# Patient Record
Sex: FEMALE | Race: WHITE | URBAN - METROPOLITAN AREA
[De-identification: names, ages, dates, MRNs, and addresses within clinical notes are randomized per-mention and may not be internally consistent; named-entity substitution may affect disease eponyms.]

---

## 2018-03-27 ENCOUNTER — RECORDS - HEALTHEAST (OUTPATIENT)
Dept: LAB | Facility: CLINIC | Age: 83
End: 2018-03-27

## 2018-03-27 LAB
ANION GAP SERPL CALCULATED.3IONS-SCNC: 9 MMOL/L (ref 5–18)
BUN SERPL-MCNC: 15 MG/DL (ref 8–28)
CALCIUM SERPL-MCNC: 9.3 MG/DL (ref 8.5–10.5)
CHLORIDE BLD-SCNC: 102 MMOL/L (ref 98–107)
CO2 SERPL-SCNC: 32 MMOL/L (ref 22–31)
CREAT SERPL-MCNC: 0.7 MG/DL (ref 0.6–1.1)
ERYTHROCYTE [DISTWIDTH] IN BLOOD BY AUTOMATED COUNT: 12.7 % (ref 11–14.5)
GFR SERPL CREATININE-BSD FRML MDRD: >60 ML/MIN/1.73M2
GLUCOSE BLD-MCNC: 76 MG/DL (ref 70–125)
HCT VFR BLD AUTO: 39.7 % (ref 35–47)
HGB BLD-MCNC: 12.1 G/DL (ref 12–16)
MCH RBC QN AUTO: 30.6 PG (ref 27–34)
MCHC RBC AUTO-ENTMCNC: 30.5 G/DL (ref 32–36)
MCV RBC AUTO: 101 FL (ref 80–100)
PLATELET # BLD AUTO: 233 THOU/UL (ref 140–440)
PMV BLD AUTO: 10.1 FL (ref 8.5–12.5)
POTASSIUM BLD-SCNC: 3.8 MMOL/L (ref 3.5–5)
RBC # BLD AUTO: 3.95 MILL/UL (ref 3.8–5.4)
SODIUM SERPL-SCNC: 143 MMOL/L (ref 136–145)
TSH SERPL DL<=0.005 MIU/L-ACNC: 2.43 UIU/ML (ref 0.3–5)
WBC: 6.9 THOU/UL (ref 4–11)

## 2018-04-28 NOTE — PROGRESS NOTES
GYNECOLOGIC ONCOLOGY CLINIC NOTE  Rosemarie Carey   : 11/3/1926   CINDY: 5/3/18   Dear Dr. 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 She is here today for a follow up visit. Patient is feeling well since she finished treatment .  She denies any fevers, chills, nausea, vomiting.     Ms. Carey was diagnosed with stage II vulvar cancer in 2011. She was treated with left radical vulvectomy and left inguinal/femoral lymph node dissection in 2011. On surveillance in 2011, a left vulvar cyst was noted and she underwent exam under anesthesia and left vulvectomy in 2011. Pathology showed invasive squamous cell carcinoma, keratinizing type and negative lymph nodes.  10/2011, she had a CT-guided biopsy of an enlarged lymph node in the left groin. This showed metastatic squamous cell carcinoma.     PET CT (10/24/11) had the following findings:  1. Interval disease progression. Stable size of the soft tissues in  the vulvar resection bed and perineum that has increased FDG uptake. This could represent recurrence, radiation, or infection. New hypermetabolic left external iliac and common femoral chain  lymphadenopathy.  2. No abnormal FDG uptake is visualized in the neck, chest, or  abdomen. For anatomical details of the neck please see dedicated neck CT report from the same day.  3. Stable 5 mm pulmonary nodule in the left lower lobe that is below the resolution of PET. Suggest continued follow up.   Patient began concurrent chemotherapy with cisplatin and external beam radiation in 2011. She completed 5 cycles of chemotherapy and 30 days of external beam radiation 3 weeks.    She has been free of disease since that time.  Her last follow up visit here was in .    Today she is feeling well except for mild incontinence of urine and ringing in her ears.  She has constipation and is now using senna, miralax and mycolog  cream for candidal vulvitis  Review of Systems:   Systemic   no weight changes; no fever; no chills; no night sweats; no appetite changes   Skin   no rashes, or lesions   Eye   no irritation; no changes in vision   Jeb-Laryngeal   Hearing loss from chemotherapy; no dysphagia; no hoarseness   Pulmonary   no cough; no shortness of breath   Cardiovascular   no chest pain; no palpitations   Gastrointestinal   no diarrhea; no constipation; no abdominal pain; no changes in bowel habits; no blood in stool   Genitourinary   yes - Urinary incontinence (chronic); sores in the vulvar region from radiation   Breast   no breast discharge; no breast changes; no breast pain   Musculoskeletal   no myalgias; no arthralgias; no back pain   Psychiatric   no depressed mood; no anxiety   Hematologic   no tender lymph nodes; no noticeable swellings or lumps   Endocrine   no hot flashes; no heat/cold intolerance   Neurological   no tremor; no numbness and tingling; no headaches; no difficulty sleeping   Patient Active Problem List   Diagnosis     Osteoporosis     Lichen sclerosus     Vulvar cancer (H)     HL (hearing loss)     Paradoxical urinary incontinence           Past Medical History    Diagnosis  Date       Hypertension         Osteoporosis         Basal cell carcinoma NOS          head       Vulvar cancer  1-2011        Dr Price       Arthritis               Past Surgical History    Procedure  Date       Appendectomy         Cholecystectomy         Gyn surgery  2006        hysterectomy       Vulvectomy radical (no groin dissection)         Retinal surgery          L eye       Excise vulva wide local  8/9/2011        Procedure:EXCISE VULVA WIDE LOCAL; Left Zhao-vulvectomy; Surgeon:MINNA PRICE; Location: OR            Health Maintenance Due    Topic  Date Due       TETANUS IMMUNIZATION ( [facility] ASSIGNED)  11/3/1938       ADVANCE DIRECTIVE PLANNING Q5 YRS (NO INBASKET)  11/3/1944       PNEUMOVAX ([facility] ASSIGNED)   11/3/1991            Current outpatient prescriptions    Medication  Sig       aspirin 81 MG tablet  Take 1 tablet by mouth daily.       olmesartan-hydrochlorothiazide (BENICAR HCT) 40-25 MG per tablet  Take 1 tablet by mouth daily.       metoprolol (TOPROL XL) 100 MG 24 hr tablet  Take 100 mg by mouth daily.       solifenacin (VESICARE) 10 MG tablet  Take 1 tablet by mouth daily.       acetaminophen (TYLENOL) 500 MG tablet  Take 1 tablet by mouth every 6 hours as needed.       Multiple Vitamins-Minerals (CENTRUM SILVER PO)  Take 1 daily       Calcium-Vitamin D (CALCIUM + D PO)  Take 2 daily       GLUCOSAMINE CHONDROITIN COMPLX PO  Take 2 daily       POTASSIUM PO  Take 1 daily       ciprofloxacin (CIPRO) 500 MG tablet  Take 1 tablet by mouth 2 times daily for 10 days.       DISCONTD: hydrocodone-acetaminophen 5-325 MG per tablet  Take 1 tablet by mouth every 4 hours as needed for pain. Limit 8 per day.       DISCONTD: ranitidine (ZANTAC) 150 MG tablet  Take 1 tablet by mouth 2 times daily.            Allergies    Allergen  Reactions       Codeine Sulfate              History    Substance Use Topics       Smoking status:  Never Smoker       Smokeless tobacco:  Never Used         Comment: smoked briefly as a young adult          Alcohol Use:  No          occasional glass of wine           History    Drug Use  No        Family History          Family History   Problem Relation Age of Onset     Stroke Mother 95     Stroke Father 61       PHYSICIAL EXAMINATION -   LMP 07/10/1980   /79  Pulse 79  Temp 97  F (36.1  C) (Oral)  Resp 18  Wt 57 kg (125 lb 10.6 oz)  LMP 07/10/1980  SpO2 98%  BMI 23.42 kg/m2  General Appearance: frail, elderly and alert, no distress   HEENT: no thyromegaly, no palpable nodules or masses   Chest- clear   CV- heart rate regular in rate and rhythm  Lymphatic - No adenopathy supraclavicular, axillary nor inguinal   Musculoskeletal: extremities non tender and without edema   Skin:  Hyperpigmentation of vulva, groin, and thighs c/w post radiation changes   Neurological: normal gait, no gross defects   Psychiatric: appropriate mood and affect   Hematological: normal inguinal lymph nodes   Gastrointestinal: abdomen soft, non-tender, non-distended, no organomegaly or masses   Genitourinary: External genitalia consistent with post radiation skin changes (hyperpigmentation) of the left vulva.  The right vulva has a 2 cm, polypoid lesion.  After informed consent, the area was prepped and injected with 1 % lidocaine.  A biopsy was performed of the lesion.  Hemostasis was maintained with silver nitrate and Monsel's solution.  Vagina is smooth without nodularity or masses. Recto-vaginal exam confirms these findings.  EXTREMITIES - Bilateral 1 + edema of lower extremities.  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   1.) 2 cm recurrent cancer suspected of the right vulva    2.) Patient has resumed aspirin.   3.) Hearing loss -  hearing.  4.) Dementia   I have discussed the findings with Ruel, the patient's son.  I recommend that they consider a local excision of this lesion for palliative intent.  We will call them with the results and they will consider treatment options.  The other option is to do no further treatment.  We will call the family with the results of the biopsy.  Marcelle Price MD FACOG   Professor and Chair   Department of Obstetrics, Gynecology and Women's Health   [facility]   [phone]

## 2018-05-03 ENCOUNTER — ONCOLOGY VISIT (OUTPATIENT)
Dept: ONCOLOGY | Facility: CLINIC | Age: 83
End: 2018-05-03
Attending: OBSTETRICS & GYNECOLOGY
Payer: MEDICARE

## 2018-05-03 VITALS
SYSTOLIC BLOOD PRESSURE: 162 MMHG | OXYGEN SATURATION: 98 % | BODY MASS INDEX: 23.42 KG/M2 | WEIGHT: 125.66 LBS | TEMPERATURE: 97 F | HEART RATE: 79 BPM | RESPIRATION RATE: 18 BRPM | DIASTOLIC BLOOD PRESSURE: 79 MMHG

## 2018-05-03 DIAGNOSIS — C51.9 VULVAR CANCER (H): Primary | ICD-10-CM

## 2018-05-03 PROCEDURE — 56605 BIOPSY OF VULVA/PERINEUM: CPT

## 2018-05-03 PROCEDURE — 99214 OFFICE O/P EST MOD 30 MIN: CPT | Mod: 25 | Performed by: OBSTETRICS & GYNECOLOGY

## 2018-05-03 PROCEDURE — 88305 TISSUE EXAM BY PATHOLOGIST: CPT | Performed by: OBSTETRICS & GYNECOLOGY

## 2018-05-03 PROCEDURE — 56605 BIOPSY OF VULVA/PERINEUM: CPT | Performed by: OBSTETRICS & GYNECOLOGY

## 2018-05-03 PROCEDURE — G0463 HOSPITAL OUTPT CLINIC VISIT: HCPCS | Mod: 25

## 2018-05-03 RX ORDER — FLUTICASONE PROPIONATE 50 MCG
2 SPRAY, SUSPENSION (ML) NASAL
COMMUNITY
Start: 2018-01-31

## 2018-05-03 RX ORDER — POTASSIUM CHLORIDE 750 MG/1
10 TABLET, EXTENDED RELEASE ORAL
COMMUNITY
Start: 2015-05-27

## 2018-05-03 ASSESSMENT — PAIN SCALES - GENERAL: PAINLEVEL: NO PAIN (0)

## 2018-05-03 NOTE — MR AVS SNAPSHOT
After Visit Summary   5/3/2018    Rosemarie Carey    MRN: [id number]           Patient Information     Date Of Birth          11/3/1926        Visit Information        Provider Department      5/3/2018 12:30 PM Marcelle Price MD [facility]        Today's Diagnoses     Vulvar cancer (H)    -  1       Follow-ups after your visit        Follow-up notes from your care team     Return if symptoms worsen or fail to improve, for Physical Exam.      Who to contact     If you have questions or need follow up information about today's clinic visit or your schedule please contact [facility] directly at [phone].  Normal or non-critical lab and imaging results will be communicated to you by MyChart, letter or phone within 4 business days after the clinic has received the results. If you do not hear from us within 7 days, please contact the clinic through [employer] or phone. If you have a critical or abnormal lab result, we will notify you by phone as soon as possible.  Submit refill requests through [employer] or call your pharmacy and they will forward the refill request to us. Please allow 3 business days for your refill to be completed.          Additional Information About Your Visit        MyChart Information     [employer] gives you secure access to your electronic health record. If you see a primary care provider, you can also send messages to your care team and make appointments. If you have questions, please call your primary care clinic.  If you do not have a primary care provider, please call [phone] and they will assist you.        Care EveryWhere ID     This is your Care EveryWhere ID. This could be used by other organizations to access your [place] medical records  [id number]        Your Vitals Were     Pulse Temperature Respirations Last Period Pulse Oximetry BMI (Body Mass Index)    79 97  F (36.1  C) (Oral) 18 07/10/1980 98% 23.42 kg/m2        Blood Pressure from Last 3 Encounters:   05/03/18 162/79   07/10/14 112/64   02/27/14 129/78    Weight from Last 3 Encounters:   05/03/18 57 kg (125 lb 10.6 oz)   07/10/14 65.2 kg (143 lb 11.2 oz)   02/27/14 65.7 kg (144 lb 12.8 oz)              We Performed the Following     Surgical Pathology Exam        Primary Care Provider Office Phone # Fax #    Holley Lopez [phone]       [address] [address]        Equal Access to Services     [facility]: Hadii denia miller hadcamilla Sokatina, waaxda mari, qarosalinata kaalmaparul mata, pavithra myrick . So [facility] [phone].    ATENCIÓN: Si habla español, tiene a polanco disposición servicios gratuitos de asistencia lingüística. [facility] [phone].    We comply with applicable federal civil rights laws and [place] laws. We do not discriminate on the basis of race, color, national origin, age, disability, sex, sexual orientation, or gender identity.            Thank you!     Thank you for choosing [facility]  for your care. Our goal is always to provide you with excellent care. Hearing back from our patients is one way we can continue to improve our services. Please take a few minutes to complete the written survey that you may receive in the mail after your visit with us. Thank you!             Your Updated Medication List - Protect others around you: Learn how to safely use, store and throw away your medicines at www.disposemymeds.org.          This list is accurate as of 5/3/18  2:18 PM.  Always use your most recent med list.                   Brand Name Dispense Instructions for use Diagnosis    aspirin 81 MG tablet      Take 1 tablet by mouth daily.        CALCIUM + D PO      Take 2 daily        * multivitamin Tabs tablet      Take 1 tablet by mouth daily    Vulvar cancer (H)       * CENTRUM SILVER PO      Take 1 daily        darifenacin 7.5 MG 24 hr tablet    ENABLEX    30 tablet     Take 1 tablet by mouth daily.    Paradoxical urinary incontinence       fluticasone 50 MCG/ACT spray    FLONASE     2 sprays        GLUCOSAMINE CHONDROITIN COMPLX PO      Take 2 daily        losartan-hydrochlorothiazide 100-25 MG per tablet    HYZAAR     Take 0.5 tablets by mouth daily    Vulvar cancer (H)       nystatin-triamcinolone cream    MYCOLOG II     Apply topically 2 times daily        oxybutynin 5 MG tablet    DITROPAN    60 tablet    Take 1 tablet (5 mg) by mouth 2 times daily    Other urinary incontinence       polyethylene glycol powder    MIRALAX/GLYCOLAX     Take 1 capful by mouth daily        potassium chloride 10 MEQ tablet    K-TAB,KLOR-CON     Take 10 mEq by mouth        POTASSIUM PO      Take 1 tablet by mouth daily    Vulvar cancer (H)       RISPERDAL PO      Take by mouth daily        sennosides 8.6 MG tablet    SENOKOT     Take 1 tablet by mouth daily        TOPROL  MG 24 hr tablet   Generic drug:  metoprolol succinate      Take 100 mg by mouth daily.        TYLENOL 500 MG tablet   Generic drug:  acetaminophen      Take 1 tablet by mouth every 6 hours as needed.        * Notice:  This list has 2 medication(s) that are the same as other medications prescribed for you. Read the directions carefully, and ask your doctor or other care provider to review them with you.

## 2018-05-03 NOTE — NURSING NOTE
"Oncology Rooming Note    May 3, 2018 1:29 PM   Rosemarie Carey is a 89 year old female who presents for:    Chief Complaint   Patient presents with     Oncology Clinic Visit     Return for Poss Re Vulva  Ca      Initial Vitals: /79  Pulse 79  Temp 97  F (36.1  C) (Oral)  Resp 18  Wt 57 kg (125 lb 10.6 oz)  LMP 07/10/1980  SpO2 98%  BMI 23.42 kg/m2 Estimated body mass index is 23.42 kg/(m^2) as calculated from the following:    Height as of 7/10/14: 1.56 m (5' 1.42\").    Weight as of this encounter: 57 kg (125 lb 10.6 oz). Body surface area is 1.57 meters squared.  No Pain (0) Comment: Data Unavailable   Patient's last menstrual period was 07/10/1980.  Allergies reviewed: Yes  Medications reviewed: Yes    Medications: Medication refills not needed today.  Pharmacy name entered into [employer]:    [facility] - [place], [place] - [address] [facility] - [place], [place] - [address]    Clinical concerns: Pt did not have a medication list with her and her son wasent sure of what med she was taken .  Albert  was notified.    10  minutes for nursing intake (face to face time)     Anya Adame MA              "

## 2018-05-03 NOTE — LETTER
5/3/2018       RE: Rosemarie Carey  [address] [place] [address]     Dear Colleague,    Thank you for referring your patient, Rosemarie Carey, to the [facility]. Please see a copy of my visit note below.    GYNECOLOGIC ONCOLOGY CLINIC NOTE  Rosemarie Carey   : 11/3/1926   CINDY: 5/3/18   Dear Dr. 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 She is here today for a follow up visit. Patient is feeling well since she finished treatment .  She denies any fevers, chills, nausea, vomiting.     Ms. Caery was diagnosed with stage II vulvar cancer in 2011. She was treated with left radical vulvectomy and left inguinal/femoral lymph node dissection in 2011. On surveillance in 2011, a left vulvar cyst was noted and she underwent exam under anesthesia and left vulvectomy in 2011. Pathology showed invasive squamous cell carcinoma, keratinizing type and negative lymph nodes.  10/2011, she had a CT-guided biopsy of an enlarged lymph node in the left groin. This showed metastatic squamous cell carcinoma.     PET CT (10/24/11) had the following findings:  1. Interval disease progression. Stable size of the soft tissues in  the vulvar resection bed and perineum that has increased FDG uptake. This could represent recurrence, radiation, or infection. New hypermetabolic left external iliac and common femoral chain  lymphadenopathy.  2. No abnormal FDG uptake is visualized in the neck, chest, or  abdomen. For anatomical details of the neck please see dedicated neck CT report from the same day.  3. Stable 5 mm pulmonary nodule in the left lower lobe that is below the resolution of PET. Suggest continued follow up.   Patient began concurrent chemotherapy with cisplatin and external beam radiation in 2011. She completed 5 cycles of chemotherapy and 30 days of external beam radiation 3 weeks.    She has been  free of disease since that time.  Her last follow up visit here was in 2014.    Today she is feeling well except for mild incontinence of urine and ringing in her ears.  She has constipation and is now using senna, miralax and mycolog cream for candidal vulvitis  Review of Systems:   Systemic   no weight changes; no fever; no chills; no night sweats; no appetite changes   Skin   no rashes, or lesions   Eye   no irritation; no changes in vision   Jeb-Laryngeal   Hearing loss from chemotherapy; no dysphagia; no hoarseness   Pulmonary   no cough; no shortness of breath   Cardiovascular   no chest pain; no palpitations   Gastrointestinal   no diarrhea; no constipation; no abdominal pain; no changes in bowel habits; no blood in stool   Genitourinary   yes - Urinary incontinence (chronic); sores in the vulvar region from radiation   Breast   no breast discharge; no breast changes; no breast pain   Musculoskeletal   no myalgias; no arthralgias; no back pain   Psychiatric   no depressed mood; no anxiety   Hematologic   no tender lymph nodes; no noticeable swellings or lumps   Endocrine   no hot flashes; no heat/cold intolerance   Neurological   no tremor; no numbness and tingling; no headaches; no difficulty sleeping   Patient Active Problem List   Diagnosis     Osteoporosis     Lichen sclerosus     Vulvar cancer (H)     HL (hearing loss)     Paradoxical urinary incontinence           Past Medical History    Diagnosis  Date       Hypertension         Osteoporosis         Basal cell carcinoma NOS          head       Vulvar cancer  1-2011        Dr Price       Arthritis               Past Surgical History    Procedure  Date       Appendectomy         Cholecystectomy         Gyn surgery  2006        hysterectomy       Vulvectomy radical (no groin dissection)         Retinal surgery          L eye       Excise vulva wide local  8/9/2011        Procedure:EXCISE VULVA WIDE LOCAL; Left Zhao-vulvectomy; Surgeon:MINNA PRICE  YANET; Location: OR            Health Maintenance Due    Topic  Date Due       TETANUS IMMUNIZATION ( [facility] ASSIGNED)  11/3/1938       ADVANCE DIRECTIVE PLANNING Q5 YRS (NO INBASKET)  11/3/1944       PNEUMOVAX ([facility] ASSIGNED)  11/3/1991            Current outpatient prescriptions    Medication  Sig       aspirin 81 MG tablet  Take 1 tablet by mouth daily.       olmesartan-hydrochlorothiazide (BENICAR HCT) 40-25 MG per tablet  Take 1 tablet by mouth daily.       metoprolol (TOPROL XL) 100 MG 24 hr tablet  Take 100 mg by mouth daily.       solifenacin (VESICARE) 10 MG tablet  Take 1 tablet by mouth daily.       acetaminophen (TYLENOL) 500 MG tablet  Take 1 tablet by mouth every 6 hours as needed.       Multiple Vitamins-Minerals (CENTRUM SILVER PO)  Take 1 daily       Calcium-Vitamin D (CALCIUM + D PO)  Take 2 daily       GLUCOSAMINE CHONDROITIN COMPLX PO  Take 2 daily       POTASSIUM PO  Take 1 daily       ciprofloxacin (CIPRO) 500 MG tablet  Take 1 tablet by mouth 2 times daily for 10 days.       DISCONTD: hydrocodone-acetaminophen 5-325 MG per tablet  Take 1 tablet by mouth every 4 hours as needed for pain. Limit 8 per day.       DISCONTD: ranitidine (ZANTAC) 150 MG tablet  Take 1 tablet by mouth 2 times daily.            Allergies    Allergen  Reactions       Codeine Sulfate              History    Substance Use Topics       Smoking status:  Never Smoker       Smokeless tobacco:  Never Used         Comment: smoked briefly as a young adult                 Alcohol Use:  No          occasional glass of wine           History    Drug Use  No        Family History          Family History   Problem Relation Age of Onset     Stroke Mother 95     Stroke Father 61        PHYSICIAL EXAMINATION -   LMP 07/10/1980   /79  Pulse 79  Temp 97  F (36.1  C) (Oral)  Resp 18  Wt 57 kg (125 lb 10.6 oz)  LMP 07/10/1980  SpO2 98%  BMI 23.42 kg/m2  General Appearance: frail, elderly and alert, no distress   HEENT:  no thyromegaly, no palpable nodules or masses   Chest- clear   CV- heart rate regular in rate and rhythm  Lymphatic - No adenopathy supraclavicular, axillary nor inguinal   Musculoskeletal: extremities non tender and without edema   Skin: Hyperpigmentation of vulva, groin, and thighs c/w post radiation changes   Neurological: normal gait, no gross defects   Psychiatric: appropriate mood and affect   Hematological: normal inguinal lymph nodes   Gastrointestinal: abdomen soft, non-tender, non-distended, no organomegaly or masses   Genitourinary: External genitalia consistent with post radiation skin changes (hyperpigmentation) of the left vulva.  The right vulva has a 2 cm, polypoid lesion.  After informed consent, the area was prepped and injected with 1 % lidocaine.  A biopsy was performed of the lesion.  Hemostasis was maintained with silver nitrate and Monsel's solution.  Vagina is smooth without nodularity or masses. Recto-vaginal exam confirms these findings.  EXTREMITIES - Bilateral 1 + edema of lower extremities.  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   1.) 2 cm recurrent cancer suspected of the right vulva    2.) Patient has resumed aspirin.   3.) Hearing loss -  hearing.  4.) Dementia   I have discussed the findings with Ruel, the patient's son.  I recommend that they consider a local excision of this lesion for palliative intent.  We will call them with the results and they will consider treatment options.  The other option is to do no further treatment.  We will call the family with the results of the biopsy.  Marcelle Price MD FACOG   Professor and Chair   Department of Obstetrics, Gynecology and Women's Health   [facility]   [phone]

## 2018-05-07 LAB — COPATH REPORT: NORMAL

## 2018-05-08 NOTE — PROGRESS NOTES
Result reviewed by Dr. Price who feels pathology does not match the clinical picture. Dr. Price feels a wide local excision is needed for symptom management. This was forwarded on to Dr. Vaughn for review.

## 2018-05-10 ENCOUNTER — TELEPHONE (OUTPATIENT)
Dept: ONCOLOGY | Facility: CLINIC | Age: 83
End: 2018-05-10

## 2018-05-10 NOTE — TELEPHONE ENCOUNTER
RN had heard from both patient's sons. They have many questions and concerns that the RN answered them to the best of her ability.    Davy (son) would like RN to speak with the care facility NP. He is very concerned that surgery, even with palliative intent, is not the correct thing. He feels his mom may not be around in six months so why put her though this. RN agreed to speak with care facility NP if deemed appropriate by the NP.     Both sons were appreciative of the RN time.     More than 30 minutes were spent discussing questions and addressing concerns.     Naheed Lockwood RN

## 2018-06-19 ENCOUNTER — ONCOLOGY VISIT (OUTPATIENT)
Dept: ONCOLOGY | Facility: CLINIC | Age: 83
End: 2018-06-19
Attending: OBSTETRICS & GYNECOLOGY
Payer: MEDICARE

## 2018-06-19 VITALS
DIASTOLIC BLOOD PRESSURE: 81 MMHG | BODY MASS INDEX: 24.04 KG/M2 | OXYGEN SATURATION: 98 % | HEART RATE: 73 BPM | RESPIRATION RATE: 16 BRPM | SYSTOLIC BLOOD PRESSURE: 168 MMHG | WEIGHT: 129 LBS | TEMPERATURE: 98 F

## 2018-06-19 DIAGNOSIS — C51.9 VULVAR CANCER (H): Primary | ICD-10-CM

## 2018-06-19 PROCEDURE — G0463 HOSPITAL OUTPT CLINIC VISIT: HCPCS

## 2018-06-19 PROCEDURE — 99215 OFFICE O/P EST HI 40 MIN: CPT | Mod: ZP | Performed by: OBSTETRICS & GYNECOLOGY

## 2018-06-19 PROCEDURE — G0463 HOSPITAL OUTPT CLINIC VISIT: HCPCS | Mod: ZF

## 2018-06-19 ASSESSMENT — PAIN SCALES - GENERAL: PAINLEVEL: NO PAIN (0)

## 2018-06-19 NOTE — MR AVS SNAPSHOT
After Visit Summary   6/19/2018    Rosemarie Carey    MRN: [id number]           Patient Information     Date Of Birth          11/3/1926        Visit Information        Provider Department      6/19/2018 11:00 AM Kathleen Vaughn MD [facility]        Today's Diagnoses     Vulvar cancer (H)    -  1      Care Instructions    Continue current wound care    No surgical intervention at this time    Follow-up with Dr. Vaughn in three months for a repeat vulvar exam and possible biopsy    Kathleen Vaughn MD  Gynecologic Oncology  [facility]            Follow-ups after your visit        Follow-up notes from your care team     Return in about 3 months (around 9/19/2018).      Your next 10 appointments already scheduled     Sep 18, 2018 10:00 AM CDT   (Arrive by 9:45 AM)   Return Visit with Kathleen Vaughn MD   [facility] ([facility])    [address]  [address]  [facility] [address] [phone]              Who to contact     If you have questions or need follow up information about today's clinic visit or your schedule please contact [facility] directly at [phone].  Normal or non-critical lab and imaging results will be communicated to you by MyChart, letter or phone within 4 business days after the clinic has received the results. If you do not hear from us within 7 days, please contact the clinic through MyChart or phone. If you have a critical or abnormal lab result, we will notify you by phone as soon as possible.  Submit refill requests through [employer] or call your pharmacy and they will forward the refill request to us. Please allow 3 business days for your refill to be completed.          Additional Information About Your Visit        MyChart Information     [employer] gives you secure access to your electronic health record. If you see a primary care provider, you can also send  messages to your care team and make appointments. If you have questions, please call your primary care clinic.  If you do not have a primary care provider, please call [phone] and they will assist you.        Care EveryWhere ID     This is your Care EveryWhere ID. This could be used by other organizations to access your [facility] medical records  [id number]        Your Vitals Were     Pulse Temperature Respirations Last Period Pulse Oximetry BMI (Body Mass Index)    73 98  F (36.7  C) (Oral) 16 07/10/1980 98% 24.04 kg/m2       Blood Pressure from Last 3 Encounters:   06/19/18 168/81   05/03/18 162/79   07/10/14 112/64    Weight from Last 3 Encounters:   06/19/18 58.5 kg (129 lb)   05/03/18 57 kg (125 lb 10.6 oz)   07/10/14 65.2 kg (143 lb 11.2 oz)              Today, you had the following     No orders found for display       Primary Care Provider Office Phone # Fax #    Holley Lopez [phone]       [address] [address] [address]        Equal Access to Services     [facility]: Hadii aad ku hadmichaelo Sokatina, waaxda luqadaha, qaybta kaalmada adewaleskayada, pavithra myrick . So [facility] [phone].    ATENCIÓN: Si habla español, tiene a polanco disposición servicios gratuitos de asistencia lingüística. Llame al [phone].    We comply with applicable federal civil rights laws and [place] laws. We do not discriminate on the basis of race, color, national origin, age, disability, sex, sexual orientation, or gender identity.            Thank you!     Thank you for choosing [facility]  for your care. Our goal is always to provide you with excellent care. Hearing back from our patients is one way we can continue to improve our services. Please take a few minutes to complete the written survey that you may receive in the mail after your visit with us. Thank you!             Your Updated Medication List - Protect others around you: Learn how  to safely use, store and throw away your medicines at www.disposemymeds.org.          This list is accurate as of 6/19/18 11:59 PM.  Always use your most recent med list.                   Brand Name Dispense Instructions for use Diagnosis    aspirin 81 MG tablet      Take 1 tablet by mouth daily.        CALCIUM + D PO      Take 2 daily        * multivitamin Tabs tablet      Take 1 tablet by mouth daily    Vulvar cancer (H)       * CENTRUM SILVER PO      Take 1 daily        darifenacin 7.5 MG 24 hr tablet    ENABLEX    30 tablet    Take 1 tablet by mouth daily.    Paradoxical urinary incontinence       fluticasone 50 MCG/ACT spray    FLONASE     2 sprays        GLUCOSAMINE CHONDROITIN COMPLX PO      Take 2 daily        losartan-hydrochlorothiazide 100-25 MG per tablet    HYZAAR     Take 0.5 tablets by mouth daily    Vulvar cancer (H)       nystatin-triamcinolone cream    MYCOLOG II     Apply topically 2 times daily        oxybutynin 5 MG tablet    DITROPAN    60 tablet    Take 1 tablet (5 mg) by mouth 2 times daily    Other urinary incontinence       polyethylene glycol powder    MIRALAX/GLYCOLAX     Take 1 capful by mouth daily        potassium chloride 10 MEQ tablet    K-TAB,KLOR-CON     Take 10 mEq by mouth        POTASSIUM PO      Take 1 tablet by mouth daily    Vulvar cancer (H)       RISPERDAL PO      Take by mouth daily        sennosides 8.6 MG tablet    SENOKOT     Take 1 tablet by mouth daily        TOPROL  MG 24 hr tablet   Generic drug:  metoprolol succinate      Take 100 mg by mouth daily.        TYLENOL 500 MG tablet   Generic drug:  acetaminophen      Take 1 tablet by mouth every 6 hours as needed.        * Notice:  This list has 2 medication(s) that are the same as other medications prescribed for you. Read the directions carefully, and ask your doctor or other care provider to review them with you.

## 2018-06-19 NOTE — PROGRESS NOTES
Consult Notes on Referred Patient    Date: 2018       Dr. Holley Lopez MD  [address] [facility] [address]  [place] [address]       RE: Rosemarie Carey  : 11/3/1926  CINDY: 2018    Dear Dr. Holley Lopez:    I had the pleasure of seeing your patient Rosemarie Carey here at the Gynecologic Cancer Clinic at the [facility] on 2018.  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  Given these findings she was subsequently sent to the Gynecologic Cancer Clinic for new patient consultation.     She presents today with family for evaluation:    Per chart review, her history is as follows:    2011: initial diagnosis, underwent left radical vulvectomy with inguinofemoral LN dissection  Aug-Oct 2011: Lt vulvar cyst showing invasive SCC removed, enlarging Lt groin LN +for metastatic disease removed  Dec 2011: cisplatin chemotherapy x5 cycles, 30 days of EBRT    Disease free since , though last f/u 2014: now with 2 cm lesion of Rt vulva. Suspicion for recurrence, Bx obtained.  FINAL DIAGNOSIS:   RIGHT VULVA, BIOPSY:   - Surface ulceration with hyalinization and tissue necrosis   - Benign vascular proliferation, consistent with a reactive/reparative   process   - No evidence of atypia or malignancy      Patient presents today for evaluation and a second opinion.  She was seen by Dr. Price in May at which time she did the biopsy and was concerned about recurrence. She had advised patient and family about need for resection.  The patient and her son are here today for evaluation.  She is not interested in surgical resection, just wants to be comfortable.  Since she saw Dr. Price, she has been receiving diligent vulvar cares and feels that the area of concern is improved.    Past Medical History:    Past Medical History:   Diagnosis Date     Arthritis      Basal cell  carcinoma nos     head     Hypertension      Osteoporosis      Vulvar cancer (H) 1-2011    Stage II B         Past Surgical History:    Past Surgical History:   Procedure Laterality Date     APPENDECTOMY       CHOLECYSTECTOMY       EXCISE VULVA WIDE LOCAL  8/9/2011    Procedure:EXCISE VULVA WIDE LOCAL; Left Zhao-vulvectomy; Surgeon:MINNA AVERY; Location:[facility]     GYN SURGERY  2006    hysterectomy     retinal surgery      L eye     VULVECTOMY RADICAL DISSECT GROIN(S)  2011    nodes negative         Health Maintenance:  Health Maintenance Due   Topic Date Due     TETANUS IMMUNIZATION (SYSTEM ASSIGNED)  11/03/1944     ADVANCE DIRECTIVE PLANNING Q5 YRS  11/03/1981     FALL RISK ASSESSMENT  11/03/1991     PNEUMOCOCCAL (1 of 2 - PCV13) 11/03/1991       Current Medications:     has a current medication list which includes the following prescription(s): acetaminophen, aspirin, calcium citrate-vitamin d, darifenacin, fluticasone, glucosamine-chondroitin, losartan-hydrochlorothiazide, metoprolol succinate, multiple vitamins-minerals, multivitamin, nystatin-triamcinolone, oxybutynin, polyethylene glycol, potassium chloride, potassium, risperidone, and sennosides.       Allergies:      Allergies   Allergen Reactions     Codeine Sulfate             Social History:     Social History   Substance Use Topics     Smoking status: Never Smoker     Smokeless tobacco: Never Used      Comment: smoked briefly as a young adult     Alcohol use No      Comment: occasional glass of wine       History   Drug Use No           Family History:     The patient's family history is notable for :    Family History   Problem Relation Age of Onset     Cerebrovascular Disease Mother 95     Cerebrovascular Disease Father 61         Physical Exam:     LMP 07/10/1980  There is no height or weight on file to calculate BMI.    General Appearance: healthy and alert, no distress     Musculoskeletal: extremities non tender and without  edema    Skin: no lesions or rashes     Neurological: normal gait, no gross defects     Psychiatric: appropriate mood and affect                               Hematological: normal cervical, supraclavicular and inguinal lymph nodes     Gastrointestinal:       abdomen soft, non-tender, non-distended, no organomegaly or masses    Genitourinary: External genitalia with significant scarring and retraction post resection and XRT.  She has radiation associated irritation and breakdown most notably in left groin crease as well as ulceration and nodularity in this area.  I believe this is the region that was biopsied by Dr. Price although the specimen as labelled right.  On the right groin, no gross lesions but notable radiation changes.       Assessment:    Rosemarie Carey is a 89 year old woman with a history of recurrent vulvar cancer, now with concerns for recurrence    A total of 60 minutes was spent with the patient, 50 minutes of which were spent in counseling the patient and/or treatment planning.      Plan:     1.)     History of recurrent vulvar cancer, now with concerns for recurrence:  Detailed discussion with patient and her family.  Biopsy was not definitive for recurrence and my exam findings are slightly different from what was noted by Dr. Price. Additionally, both patient and her family feel that her groin/vulvar area is improved since her visit with Dr. Price due to more diligent skin care.  As this is my first time examining patient, I am unable to determine if this is true.  Regardless, there is an area ulceration and retraction in left groin/vulvar crease which is potentially recurrence versus radiation induced skin breakdown.  Repeat more aggressive biopsy would be need to help differentiate this.  At this time, the patient declines any further intervention. She declines surgery. She is not interested in rebiopsy.  Additionally, her son and family who are present for this discussion are not  supportive of her undergoing additional surgery.  Therefore, offered patient close observation.  It would be helpful for me to see her for a short-term follow-up to re-examine the groin.  This will give me a better sense of her clinical exam findings, if the area of ulceration is enlarging and thus more concerning for malignancy.    Discussed in detail, questions answered, patient and family happy with plan of care.    Kathleen Vaughn MD  Gynecologic Oncology  [facility]      CC  Patient Care Team:  Marifer Campbell MD as PCP - General  Marifer Campbell MD Newton, David A as Consulting Physician (Family Practice)  MARIFER CAMPBELL

## 2018-06-19 NOTE — LETTER
2018       RE: Rosemarie Carey  [address]  [place] [address]     Dear Colleague,    Thank you for referring your patient, Rosemarie Carey, to the [facility]. Please see a copy of my visit note below.                            Consult Notes on Referred Patient    Date: 2018       Dr. Holley Lopez MD  [address]  [place] [address]       RE: Rosemarie Carey  : 11/3/1926  CINDY: 2018    Dear Dr. Holley Lopez:    I had the pleasure of seeing your patient Rosemarie Carey here at the Gynecologic Cancer Clinic at the [facility] on 2018.  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  Given these findings she was subsequently sent to the Gynecologic Cancer Clinic for new patient consultation.     She presents today with family for evaluation:    Per chart review, her history is as follows:    2011: initial diagnosis, underwent left radical vulvectomy with inguinofemoral LN dissection  Aug-Oct 2011: Lt vulvar cyst showing invasive SCC removed, enlarging Lt groin LN +for metastatic disease removed  Dec 2011: cisplatin chemotherapy x5 cycles, 30 days of EBRT    Disease free since , though last f/u 2014: now with 2 cm lesion of Rt vulva. Suspicion for recurrence, Bx obtained.  FINAL DIAGNOSIS:   RIGHT VULVA, BIOPSY:   - Surface ulceration with hyalinization and tissue necrosis   - Benign vascular proliferation, consistent with a reactive/reparative   process   - No evidence of atypia or malignancy      Patient presents today for evaluation and a second opinion.  She was seen by Dr. Price in May at which time she did the biopsy and was concerned about recurrence. She had advised patient and family about need for resection.  The patient and her son are here today for evaluation.  She is not interested in surgical resection, just  wants to be comfortable.  Since she saw Dr. Price, she has been receiving diligent vulvar cares and feels that the area of concern is improved.    Past Medical History:    Past Medical History:   Diagnosis Date     Arthritis      Basal cell carcinoma nos     head     Hypertension      Osteoporosis      Vulvar cancer (H) 1-2011    Stage II B         Past Surgical History:    Past Surgical History:   Procedure Laterality Date     APPENDECTOMY       CHOLECYSTECTOMY       EXCISE VULVA WIDE LOCAL  8/9/2011    Procedure:EXCISE VULVA WIDE LOCAL; Left Zhao-vulvectomy; Surgeon:MINNA PRICE; Location:[facility]     GYN SURGERY  2006    hysterectomy     retinal surgery      L eye     VULVECTOMY RADICAL DISSECT GROIN(S)  2011    nodes negative         Health Maintenance:  Health Maintenance Due   Topic Date Due     TETANUS IMMUNIZATION (SYSTEM ASSIGNED)  11/03/1944     ADVANCE DIRECTIVE PLANNING Q5 YRS  11/03/1981     FALL RISK ASSESSMENT  11/03/1991     PNEUMOCOCCAL (1 of 2 - PCV13) 11/03/1991       Current Medications:     has a current medication list which includes the following prescription(s): acetaminophen, aspirin, calcium citrate-vitamin d, darifenacin, fluticasone, glucosamine-chondroitin, losartan-hydrochlorothiazide, metoprolol succinate, multiple vitamins-minerals, multivitamin, nystatin-triamcinolone, oxybutynin, polyethylene glycol, potassium chloride, potassium, risperidone, and sennosides.       Allergies:      Allergies   Allergen Reactions     Codeine Sulfate             Social History:     Social History   Substance Use Topics     Smoking status: Never Smoker     Smokeless tobacco: Never Used      Comment: smoked briefly as a young adult     Alcohol use No      Comment: occasional glass of wine       History   Drug Use No           Family History:     The patient's family history is notable for :    Family History   Problem Relation Age of Onset     Cerebrovascular Disease Mother 95     Cerebrovascular  Disease Father 61         Physical Exam:     LMP 07/10/1980  There is no height or weight on file to calculate BMI.    General Appearance: healthy and alert, no distress     Musculoskeletal: extremities non tender and without edema    Skin: no lesions or rashes     Neurological: normal gait, no gross defects     Psychiatric: appropriate mood and affect                               Hematological: normal cervical, supraclavicular and inguinal lymph nodes     Gastrointestinal:       abdomen soft, non-tender, non-distended, no organomegaly or masses    Genitourinary: External genitalia with significant scarring and retraction post resection and XRT.  She has radiation associated irritation and breakdown most notably in left groin crease as well as ulceration and nodularity in this area.  I believe this is the region that was biopsied by Dr. Price although the specimen as labelled right.  On the right groin, no gross lesions but notable radiation changes.       Assessment:    Rosemarie Carey is a 89 year old woman with a history of recurrent vulvar cancer, now with concerns for recurrence    A total of 60 minutes was spent with the patient, 50 minutes of which were spent in counseling the patient and/or treatment planning.      Plan:     1.)     History of recurrent vulvar cancer, now with concerns for recurrence:  Detailed discussion with patient and her family.  Biopsy was not definitive for recurrence and my exam findings are slightly different from what was noted by Dr. Price. Additionally, both patient and her family feel that her groin/vulvar area is improved since her visit with Dr. Price due to more diligent skin care.  As this is my first time examining patient, I am unable to determine if this is true.  Regardless, there is an area ulceration and retraction in left groin/vulvar crease which is potentially recurrence versus radiation induced skin breakdown.  Repeat more aggressive biopsy would be need to help  differentiate this.  At this time, the patient declines any further intervention. She declines surgery. She is not interested in rebiopsy.  Additionally, her son and family who are present for this discussion are not supportive of her undergoing additional surgery.  Therefore, offered patient close observation.  It would be helpful for me to see her for a short-term follow-up to re-examine the groin.  This will give me a better sense of her clinical exam findings, if the area of ulceration is enlarging and thus more concerning for malignancy.    Discussed in detail, questions answered, patient and family happy with plan of care.    Kathleen Vaughn MD  Gynecologic Oncology  [facility]      CC  Patient Care Team:  Holley Lopez MD as PCP - General  Holley Lopez MD Newton, David A as Consulting Physician (Family Practice)

## 2018-06-19 NOTE — PATIENT INSTRUCTIONS
Continue current wound care    No surgical intervention at this time    Follow-up with Dr. Vaughn in three months for a repeat vulvar exam and possible biopsy    Kathleen Vaughn MD  Gynecologic Oncology  [facility]

## 2018-06-19 NOTE — NURSING NOTE
"Oncology Rooming Note    June 19, 2018 11:25 AM   Rosemarie Carey is a 89 year old female who presents for:    Chief Complaint   Patient presents with     Oncology Clinic Visit     Vulvar CA; Pt of Dr Price,      Initial Vitals: /81  Pulse 73  Temp 98  F (36.7  C) (Oral)  Resp 16  Wt 58.5 kg (129 lb)  LMP 07/10/1980  SpO2 98%  BMI 24.04 kg/m2 Estimated body mass index is 24.04 kg/(m^2) as calculated from the following:    Height as of 7/10/14: 1.56 m (5' 1.42\").    Weight as of this encounter: 58.5 kg (129 lb). Body surface area is 1.59 meters squared.  No Pain (0) Comment: Data Unavailable   Patient's last menstrual period was 07/10/1980.  Allergies reviewed: Yes  Medications reviewed: Yes    Medications: Medication refills not needed today.  Pharmacy name entered into [employer]:    [facility] - [place], [place] - [address] [facility] - [place], [place] - [address]    Clinical concerns: Patient states there are no new concerns to discuss with provider.  Dr Vaughn was not notified.       8 minutes for nursing intake (face to face time)     Marclele George CMA              "

## 2018-11-14 ENCOUNTER — OFFICE VISIT - HEALTHEAST (OUTPATIENT)
Dept: OTOLARYNGOLOGY | Facility: CLINIC | Age: 83
End: 2018-11-14

## 2018-11-14 DIAGNOSIS — H61.23 BILATERAL IMPACTED CERUMEN: ICD-10-CM

## 2019-04-09 ENCOUNTER — APPOINTMENT (RX ONLY)
Dept: URBAN - NONMETROPOLITAN AREA CLINIC 13 | Facility: CLINIC | Age: 84
Setting detail: DERMATOLOGY
End: 2019-04-09

## 2019-04-09 DIAGNOSIS — L57.0 ACTINIC KERATOSIS: ICD-10-CM

## 2019-04-09 DIAGNOSIS — L57.8 OTHER SKIN CHANGES DUE TO CHRONIC EXPOSURE TO NONIONIZING RADIATION: ICD-10-CM

## 2019-04-09 PROBLEM — Z85.828 PERSONAL HISTORY OF OTHER MALIGNANT NEOPLASM OF SKIN: Status: ACTIVE | Noted: 2019-04-09

## 2019-04-09 PROBLEM — M12.9 ARTHROPATHY, UNSPECIFIED: Status: ACTIVE | Noted: 2019-04-09

## 2019-04-09 PROBLEM — E13.9 OTHER SPECIFIED DIABETES MELLITUS WITHOUT COMPLICATIONS: Status: ACTIVE | Noted: 2019-04-09

## 2019-04-09 PROBLEM — H91.90 UNSPECIFIED HEARING LOSS, UNSPECIFIED EAR: Status: ACTIVE | Noted: 2019-04-09

## 2019-04-09 PROBLEM — E03.9 HYPOTHYROIDISM, UNSPECIFIED: Status: ACTIVE | Noted: 2019-04-09

## 2019-04-09 PROBLEM — I10 ESSENTIAL (PRIMARY) HYPERTENSION: Status: ACTIVE | Noted: 2019-04-09

## 2019-04-09 PROCEDURE — 17000 DESTRUCT PREMALG LESION: CPT

## 2019-04-09 PROCEDURE — ? LIQUID NITROGEN

## 2019-04-09 PROCEDURE — 17003 DESTRUCT PREMALG LES 2-14: CPT

## 2019-04-09 PROCEDURE — ? SUNSCREEN RECOMMENDATIONS

## 2019-04-09 PROCEDURE — 99201: CPT | Mod: 25

## 2019-04-09 ASSESSMENT — LOCATION DETAILED DESCRIPTION DERM
LOCATION DETAILED: RIGHT MEDIAL SUPERIOR CHEST
LOCATION DETAILED: LEFT UPPER CUTANEOUS LIP
LOCATION DETAILED: STERNAL NOTCH
LOCATION DETAILED: UPPER STERNUM
LOCATION DETAILED: LEFT CENTRAL MALAR CHEEK
LOCATION DETAILED: LEFT MEDIAL SUPERIOR CHEST

## 2019-04-09 ASSESSMENT — LOCATION SIMPLE DESCRIPTION DERM
LOCATION SIMPLE: CHEST
LOCATION SIMPLE: LEFT CHEEK
LOCATION SIMPLE: LEFT LIP

## 2019-04-09 ASSESSMENT — LOCATION ZONE DERM
LOCATION ZONE: TRUNK
LOCATION ZONE: FACE
LOCATION ZONE: LIP

## 2019-04-09 NOTE — HPI: SKIN LESION
Is This A New Presentation, Or A Follow-Up?: Skin Lesion
How Severe Is Your Skin Lesion?: mild
Has Your Skin Lesion Been Treated?: been treated
Additional History: Lesion of concern

## 2019-04-09 NOTE — PROCEDURE: LIQUID NITROGEN
Render Post-Care Instructions In Note?: no
Consent: The patient's consent was obtained including but not limited to risks of crusting, scabbing, blistering, scarring, darker or lighter pigmentary change, recurrence, incomplete removal and infection.
Number Of Freeze-Thaw Cycles: 1 freeze-thaw cycle
Detail Level: Detailed
Post-Care Instructions: I reviewed with the patient in detail post-care instructions. Patient is to wear sunprotection, and avoid picking at any of the treated lesions. Pt may apply Vaseline to crusted or scabbing areas.  They were told if any of the lesions fail to resolve to f/u and have them evaluated.
Duration Of Freeze Thaw-Cycle (Seconds): 1
Aperture Size (Optional): C

## 2019-05-12 ENCOUNTER — HOME CARE/HOSPICE - HEALTHEAST (OUTPATIENT)
Dept: HOSPICE | Facility: HOSPICE | Age: 84
End: 2019-05-12

## 2019-05-13 ENCOUNTER — HOME CARE/HOSPICE - HEALTHEAST (OUTPATIENT)
Dept: HOSPICE | Facility: HOSPICE | Age: 84
End: 2019-05-13

## 2019-05-14 ENCOUNTER — HOME CARE/HOSPICE - HEALTHEAST (OUTPATIENT)
Dept: HOSPICE | Facility: HOSPICE | Age: 84
End: 2019-05-14

## 2019-05-19 ENCOUNTER — HOME CARE/HOSPICE - HEALTHEAST (OUTPATIENT)
Dept: HOSPICE | Facility: HOSPICE | Age: 84
End: 2019-05-19

## 2019-10-24 ENCOUNTER — APPOINTMENT (RX ONLY)
Dept: URBAN - NONMETROPOLITAN AREA CLINIC 13 | Facility: CLINIC | Age: 84
Setting detail: DERMATOLOGY
End: 2019-10-24

## 2019-10-24 DIAGNOSIS — L57.8 OTHER SKIN CHANGES DUE TO CHRONIC EXPOSURE TO NONIONIZING RADIATION: ICD-10-CM

## 2019-10-24 DIAGNOSIS — L57.0 ACTINIC KERATOSIS: ICD-10-CM

## 2019-10-24 PROCEDURE — 17000 DESTRUCT PREMALG LESION: CPT

## 2019-10-24 PROCEDURE — 17003 DESTRUCT PREMALG LES 2-14: CPT

## 2019-10-24 PROCEDURE — ? SUNSCREEN RECOMMENDATIONS

## 2019-10-24 PROCEDURE — ? LIQUID NITROGEN

## 2019-10-24 PROCEDURE — 99212 OFFICE O/P EST SF 10 MIN: CPT | Mod: 25

## 2019-10-24 ASSESSMENT — LOCATION SIMPLE DESCRIPTION DERM
LOCATION SIMPLE: LEFT CHEEK
LOCATION SIMPLE: RIGHT FOREARM
LOCATION SIMPLE: LEFT HAND
LOCATION SIMPLE: LEFT FOREHEAD
LOCATION SIMPLE: RIGHT FOREHEAD
LOCATION SIMPLE: LEFT CLAVICULAR SKIN
LOCATION SIMPLE: RIGHT ANTERIOR NECK
LOCATION SIMPLE: LEFT ANTERIOR NECK
LOCATION SIMPLE: CHEST

## 2019-10-24 ASSESSMENT — LOCATION DETAILED DESCRIPTION DERM
LOCATION DETAILED: LEFT CLAVICULAR NECK
LOCATION DETAILED: LEFT CLAVICULAR SKIN
LOCATION DETAILED: RIGHT INFERIOR LATERAL NECK
LOCATION DETAILED: UPPER STERNUM
LOCATION DETAILED: RIGHT LATERAL FOREHEAD
LOCATION DETAILED: LEFT ULNAR DORSAL HAND
LOCATION DETAILED: LEFT CENTRAL MALAR CHEEK
LOCATION DETAILED: STERNAL NOTCH
LOCATION DETAILED: RIGHT PROXIMAL DORSAL FOREARM
LOCATION DETAILED: RIGHT SUPERIOR LATERAL FOREHEAD
LOCATION DETAILED: RIGHT DISTAL DORSAL FOREARM
LOCATION DETAILED: LEFT MEDIAL SUPERIOR CHEST
LOCATION DETAILED: LEFT MEDIAL FOREHEAD

## 2019-10-24 ASSESSMENT — LOCATION ZONE DERM
LOCATION ZONE: NECK
LOCATION ZONE: ARM
LOCATION ZONE: TRUNK
LOCATION ZONE: HAND
LOCATION ZONE: FACE

## 2019-10-24 NOTE — PROCEDURE: LIQUID NITROGEN
Render Note In Bullet Format When Appropriate: No
Aperture Size (Optional): C
Detail Level: Detailed
Consent: The patient's consent was obtained including but not limited to risks of crusting, scabbing, blistering, scarring, darker or lighter pigmentary change, recurrence, incomplete removal and infection.
Number Of Freeze-Thaw Cycles: 1 freeze-thaw cycle
Post-Care Instructions: I reviewed with the patient in detail post-care instructions. Patient is to wear sunprotection, and avoid picking at any of the treated lesions. Pt may apply Vaseline to crusted or scabbing areas.  They were told if any of the lesions fail to resolve to f/u and have them evaluated.
Duration Of Freeze Thaw-Cycle (Seconds): 1

## 2020-03-01 ENCOUNTER — HEALTH MAINTENANCE LETTER (OUTPATIENT)
Age: 85
End: 2020-03-01

## 2020-12-14 ENCOUNTER — HEALTH MAINTENANCE LETTER (OUTPATIENT)
Age: 85
End: 2020-12-14

## 2021-03-18 ENCOUNTER — APPOINTMENT (RX ONLY)
Dept: URBAN - NONMETROPOLITAN AREA CLINIC 13 | Facility: CLINIC | Age: 86
Setting detail: DERMATOLOGY
End: 2021-03-18

## 2021-03-18 DIAGNOSIS — L57.8 OTHER SKIN CHANGES DUE TO CHRONIC EXPOSURE TO NONIONIZING RADIATION: ICD-10-CM

## 2021-03-18 DIAGNOSIS — L57.0 ACTINIC KERATOSIS: ICD-10-CM

## 2021-03-18 PROCEDURE — 17000 DESTRUCT PREMALG LESION: CPT

## 2021-03-18 PROCEDURE — ? LIQUID NITROGEN

## 2021-03-18 PROCEDURE — ? COUNSELING

## 2021-03-18 PROCEDURE — 17003 DESTRUCT PREMALG LES 2-14: CPT

## 2021-03-18 PROCEDURE — 99212 OFFICE O/P EST SF 10 MIN: CPT | Mod: 25

## 2021-03-18 PROCEDURE — ? SUNSCREEN RECOMMENDATIONS

## 2021-03-18 ASSESSMENT — LOCATION DETAILED DESCRIPTION DERM
LOCATION DETAILED: LEFT UPPER CUTANEOUS LIP
LOCATION DETAILED: STERNAL NOTCH
LOCATION DETAILED: RIGHT MEDIAL SUPERIOR CHEST
LOCATION DETAILED: LEFT INFERIOR CENTRAL MALAR CHEEK
LOCATION DETAILED: LEFT MEDIAL SUPERIOR CHEST
LOCATION DETAILED: LEFT CENTRAL MALAR CHEEK
LOCATION DETAILED: LEFT SUPERIOR CENTRAL MALAR CHEEK
LOCATION DETAILED: MIDDLE STERNUM
LOCATION DETAILED: INFERIOR MID FOREHEAD
LOCATION DETAILED: UPPER STERNUM
LOCATION DETAILED: RIGHT CLAVICULAR SKIN

## 2021-03-18 ASSESSMENT — LOCATION SIMPLE DESCRIPTION DERM
LOCATION SIMPLE: RIGHT CLAVICULAR SKIN
LOCATION SIMPLE: LEFT CHEEK
LOCATION SIMPLE: LEFT LIP
LOCATION SIMPLE: INFERIOR FOREHEAD
LOCATION SIMPLE: CHEST

## 2021-03-18 ASSESSMENT — LOCATION ZONE DERM
LOCATION ZONE: TRUNK
LOCATION ZONE: FACE
LOCATION ZONE: LIP

## 2021-03-18 NOTE — PROCEDURE: LIQUID NITROGEN
Consent: The patient's consent was obtained including but not limited to risks of crusting, scabbing, blistering, scarring, darker or lighter pigmentary change, recurrence, incomplete removal and infection.
Aperture Size (Optional): C
Post-Care Instructions: I reviewed with the patient in detail post-care instructions. Patient is to wear sunprotection, and avoid picking at any of the treated lesions. Pt may apply Vaseline to crusted or scabbing areas.  They were told if any of the lesions fail to resolve to f/u and have them evaluated.
Number Of Freeze-Thaw Cycles: 1 freeze-thaw cycle
Duration Of Freeze Thaw-Cycle (Seconds): 1
Render Note In Bullet Format When Appropriate: No
Detail Level: Detailed

## 2021-04-17 ENCOUNTER — HEALTH MAINTENANCE LETTER (OUTPATIENT)
Age: 86
End: 2021-04-17

## 2021-06-21 NOTE — PROGRESS NOTES
HPI: This patient presents to clinic today for cerumen removal.  She is accompanied by her son.  Patient does wear hearing aids which she got from [facility] at baseline, but there is a concern they aren't working properly.  Lives in memory care. Has noticed some fullness of the ears and muffled hearing, but denies otalgia, otorrhea, vertigo, change in true hearing.  Has tinnitus at baseline. Denies other symptoms.    Past medical history, surgical history, family history, social history, medications, and allergies have been reviewed and are documented above.     Review of Systems: a 10-system review was performed. Symptoms are noted in the HPI and on a scanned document in the computer chart.    Physical Examination:   GEN: no acute distress, alert and oriented  EYES: extraocular movements intact, pupils equal and round. Sclera clear.   EARS: bilateral cerumen impactions cleared under binocular microscopy using curette, forceps, and suction. The tympanic membranes are noted to be intact and clear with no signs of infections, effusions, perforations, or retractions.  PULM: breathing comfortably on room air with no stertor or stridor. Chest expansion symmetric.  NEURO: dementia, shuffling gate    MEDICAL DECISION-MAKING: Cerumen impactions cleared under binocular microscopy without difficulty. Hearing restored to baseline. Encouraged patient and son to follow-up with [facility] regarding hearing aids. Follow-up PRN.

## 2021-10-02 ENCOUNTER — HEALTH MAINTENANCE LETTER (OUTPATIENT)
Age: 86
End: 2021-10-02

## 2022-05-14 ENCOUNTER — HEALTH MAINTENANCE LETTER (OUTPATIENT)
Age: 87
End: 2022-05-14

## 2022-09-03 ENCOUNTER — HEALTH MAINTENANCE LETTER (OUTPATIENT)
Age: 87
End: 2022-09-03

## 2023-06-02 ENCOUNTER — HEALTH MAINTENANCE LETTER (OUTPATIENT)
Age: 88
End: 2023-06-02